# Patient Record
Sex: MALE | Race: WHITE | NOT HISPANIC OR LATINO | Employment: STUDENT | URBAN - METROPOLITAN AREA
[De-identification: names, ages, dates, MRNs, and addresses within clinical notes are randomized per-mention and may not be internally consistent; named-entity substitution may affect disease eponyms.]

---

## 2017-12-14 ENCOUNTER — HOSPITAL ENCOUNTER (EMERGENCY)
Facility: HOSPITAL | Age: 22
Discharge: HOME | End: 2017-12-14
Attending: FAMILY MEDICINE
Payer: COMMERCIAL

## 2017-12-14 VITALS
SYSTOLIC BLOOD PRESSURE: 134 MMHG | OXYGEN SATURATION: 97 % | TEMPERATURE: 99 F | BODY MASS INDEX: 26.66 KG/M2 | HEIGHT: 69 IN | RESPIRATION RATE: 18 BRPM | HEART RATE: 88 BPM | WEIGHT: 180 LBS | DIASTOLIC BLOOD PRESSURE: 68 MMHG

## 2017-12-14 DIAGNOSIS — F41.9 ANXIETY: Primary | ICD-10-CM

## 2017-12-14 LAB
ALBUMIN SERPL BCP-MCNC: 4.3 G/DL
ALP SERPL-CCNC: 58 U/L
ALT SERPL W/O P-5'-P-CCNC: 15 U/L
AMPHET+METHAMPHET UR QL: NEGATIVE
ANION GAP SERPL CALC-SCNC: 6 MMOL/L
APAP SERPL-MCNC: <3 UG/ML
AST SERPL-CCNC: 15 U/L
BARBITURATES UR QL SCN>200 NG/ML: NEGATIVE
BASOPHILS # BLD AUTO: 0.02 K/UL
BASOPHILS NFR BLD: 0.4 %
BENZODIAZ UR QL SCN>200 NG/ML: NORMAL
BILIRUB SERPL-MCNC: 0.2 MG/DL
BILIRUB UR QL STRIP: NEGATIVE
BUN SERPL-MCNC: 11 MG/DL
BZE UR QL SCN: NEGATIVE
CALCIUM SERPL-MCNC: 10.1 MG/DL
CANNABINOIDS UR QL SCN: NORMAL
CHLORIDE SERPL-SCNC: 108 MMOL/L
CLARITY UR REFRACT.AUTO: CLEAR
CO2 SERPL-SCNC: 27 MMOL/L
COLOR UR AUTO: YELLOW
CREAT SERPL-MCNC: 0.9 MG/DL
CREAT UR-MCNC: 137 MG/DL
DIFFERENTIAL METHOD: NORMAL
EOSINOPHIL # BLD AUTO: 0 K/UL
EOSINOPHIL NFR BLD: 0.6 %
ERYTHROCYTE [DISTWIDTH] IN BLOOD BY AUTOMATED COUNT: 12 %
EST. GFR  (AFRICAN AMERICAN): >60 ML/MIN/1.73 M^2
EST. GFR  (NON AFRICAN AMERICAN): >60 ML/MIN/1.73 M^2
ETHANOL SERPL-MCNC: <10 MG/DL
GLUCOSE SERPL-MCNC: 104 MG/DL
GLUCOSE UR QL STRIP: NEGATIVE
HCT VFR BLD AUTO: 44.6 %
HGB BLD-MCNC: 14.9 G/DL
HGB UR QL STRIP: NEGATIVE
IMM GRANULOCYTES # BLD AUTO: 0.01 K/UL
IMM GRANULOCYTES NFR BLD AUTO: 0.2 %
KETONES UR QL STRIP: NEGATIVE
LEUKOCYTE ESTERASE UR QL STRIP: NEGATIVE
LYMPHOCYTES # BLD AUTO: 1.1 K/UL
LYMPHOCYTES NFR BLD: 20.6 %
MCH RBC QN AUTO: 29.7 PG
MCHC RBC AUTO-ENTMCNC: 33.4 G/DL
MCV RBC AUTO: 89 FL
METHADONE UR QL SCN>300 NG/ML: NEGATIVE
MONOCYTES # BLD AUTO: 0.5 K/UL
MONOCYTES NFR BLD: 8.3 %
NEUTROPHILS # BLD AUTO: 3.8 K/UL
NEUTROPHILS NFR BLD: 69.9 %
NITRITE UR QL STRIP: NEGATIVE
NRBC BLD-RTO: 0 /100 WBC
OPIATES UR QL SCN: NEGATIVE
PCP UR QL SCN>25 NG/ML: NEGATIVE
PH UR STRIP: 6 [PH] (ref 5–8)
PLATELET # BLD AUTO: 188 K/UL
PMV BLD AUTO: 11.4 FL
POTASSIUM SERPL-SCNC: 4.3 MMOL/L
PROT SERPL-MCNC: 7.9 G/DL
PROT UR QL STRIP: NEGATIVE
RBC # BLD AUTO: 5.01 M/UL
SODIUM SERPL-SCNC: 141 MMOL/L
SP GR UR STRIP: 1.02 (ref 1–1.03)
TOXICOLOGY INFORMATION: NORMAL
TSH SERPL DL<=0.005 MIU/L-ACNC: 1.42 UIU/ML
URN SPEC COLLECT METH UR: NORMAL
UROBILINOGEN UR STRIP-ACNC: NEGATIVE EU/DL
WBC # BLD AUTO: 5.43 K/UL

## 2017-12-14 PROCEDURE — 80329 ANALGESICS NON-OPIOID 1 OR 2: CPT

## 2017-12-14 PROCEDURE — 84443 ASSAY THYROID STIM HORMONE: CPT

## 2017-12-14 PROCEDURE — 85025 COMPLETE CBC W/AUTO DIFF WBC: CPT

## 2017-12-14 PROCEDURE — 93010 ELECTROCARDIOGRAM REPORT: CPT | Mod: ,,, | Performed by: PEDIATRICS

## 2017-12-14 PROCEDURE — 99284 EMERGENCY DEPT VISIT MOD MDM: CPT | Mod: 25

## 2017-12-14 PROCEDURE — 93005 ELECTROCARDIOGRAM TRACING: CPT

## 2017-12-14 PROCEDURE — 80053 COMPREHEN METABOLIC PANEL: CPT

## 2017-12-14 PROCEDURE — 81003 URINALYSIS AUTO W/O SCOPE: CPT

## 2017-12-14 PROCEDURE — 99284 EMERGENCY DEPT VISIT MOD MDM: CPT | Mod: ,,,

## 2017-12-14 PROCEDURE — 80320 DRUG SCREEN QUANTALCOHOLS: CPT

## 2017-12-14 PROCEDURE — 80307 DRUG TEST PRSMV CHEM ANLYZR: CPT

## 2017-12-14 NOTE — ED PROVIDER NOTES
Encounter Date: 12/14/2017       History     Chief Complaint   Patient presents with    Psychiatric Evaluation     i need psyche eval, denies suicidal/homicidal ideation, tough week at school, couldn't make decisions, feeling down     22-year-old male with medical history of ADHD presents to the ED for psychiatric evaluation.  Patient denies suicidal or homicidal ideation.  Patient is a student at [employer] and had anxiety/depression breakdown last week during finals week.  Patient reports an accumulation of increased stress including breaking up with girlfriend and having poor support group in [place].  Patient does endorse drug and alcohol use.  He states he was using drugs more frequently at the beginning of the semester and realized there causing more harm than good.  Patient does report drinking a glass of wine and smoking pot last night.  He is also prescribed Vyvanse which he has not taken in over a week.  Last week when patient was having his breakdown, he reports becoming diaphoretic and short of breath.  Patient denies chest pain, shortness breath, weakness, syncope, nausea, vomiting, headache, changes in vision.  Patient's mother attempted to make appointment with psychiatry however was not able to get in until January so was told to report to the ED to be seen sooner.          Review of patient's allergies indicates:   Allergen Reactions    Sulfa (sulfonamide antibiotics) Rash     Past Medical History:   Diagnosis Date    ADHD (attention deficit hyperactivity disorder)     Anesthesia complication     woke up with rash,vomiting after surgery    PONV (postoperative nausea and vomiting)      Past Surgical History:   Procedure Laterality Date    NASAL SINUS SURGERY       Family History   Problem Relation Age of Onset    Diabetes Paternal Uncle     Glaucoma Maternal Grandmother     Cataracts Maternal Grandmother     Glaucoma Maternal Grandfather     Cancer Paternal Grandmother     Thyroid disease  Paternal Grandmother     Amblyopia Neg Hx     Blindness Neg Hx     Hypertension Neg Hx     Macular degeneration Neg Hx     Retinal detachment Neg Hx     Strabismus Neg Hx     Stroke Neg Hx      Social History   Substance Use Topics    Smoking status: Never Smoker    Smokeless tobacco: Not on file    Alcohol use Yes     Review of Systems   Constitutional: Negative for chills, diaphoresis, fatigue and fever.   HENT: Negative for congestion and sore throat.    Eyes: Negative for visual disturbance.   Respiratory: Negative for cough and shortness of breath.    Cardiovascular: Negative for chest pain and leg swelling.   Gastrointestinal: Negative for abdominal distention, nausea and vomiting.   Genitourinary: Negative for dysuria and hematuria.   Musculoskeletal: Negative for myalgias and neck pain.   Skin: Negative for rash.   Neurological: Negative for syncope, weakness, light-headedness and headaches.   Psychiatric/Behavioral: Positive for sleep disturbance. Negative for behavioral problems, hallucinations, self-injury and suicidal ideas. The patient is nervous/anxious.        Physical Exam     Initial Vitals [12/14/17 1049]   BP Pulse Resp Temp SpO2   134/68 88 18 98.9 °F (37.2 °C) 97 %      MAP       90         Physical Exam    Vitals reviewed.  Constitutional: He appears well-developed and well-nourished. He is not diaphoretic. No distress.   HENT:   Head: Normocephalic and atraumatic.   Nose: Nose normal.   Mouth/Throat: Oropharynx is clear and moist. No oropharyngeal exudate.   Eyes: Conjunctivae and EOM are normal. Pupils are equal, round, and reactive to light. Right eye exhibits no discharge. Left eye exhibits no discharge.   Neck: Normal range of motion. Neck supple. No thyromegaly present.   Cardiovascular: Normal rate, regular rhythm and intact distal pulses.   Pulmonary/Chest: Breath sounds normal. No respiratory distress. He has no wheezes. He exhibits no tenderness.   Abdominal: Soft. Bowel  sounds are normal. He exhibits no distension. There is no tenderness. There is no rebound.   Musculoskeletal: Normal range of motion. He exhibits no tenderness.   Lymphadenopathy:     He has no cervical adenopathy.   Neurological: He is alert and oriented to person, place, and time. He has normal strength. No cranial nerve deficit or sensory deficit.   Skin: Skin is warm and dry. Capillary refill takes less than 2 seconds. No rash noted.   Psychiatric: He has a normal mood and affect. His speech is normal and behavior is normal. Thought content normal. His mood appears not anxious. He is not agitated, not aggressive, not slowed and not withdrawn. Cognition and memory are normal. Cognition and memory are not impaired. He does not express impulsivity. He does not exhibit a depressed mood. He expresses no homicidal and no suicidal ideation. He expresses no suicidal plans and no homicidal plans.         ED Course   Procedures  Labs Reviewed   COMPREHENSIVE METABOLIC PANEL - Abnormal; Notable for the following:        Result Value    Anion Gap 6 (*)     All other components within normal limits   ACETAMINOPHEN LEVEL - Abnormal; Notable for the following:     Acetaminophen (Tylenol), Serum <3.0 (*)     All other components within normal limits   TSH   CBC W/ AUTO DIFFERENTIAL   URINALYSIS   DRUG SCREEN PANEL, URINE EMERGENCY   ALCOHOL,MEDICAL (ETHANOL)             Medical Decision Making:   History:   Old Medical Records: I decided to obtain old medical records.  Old Records Summarized: records from clinic visits.  Clinical Tests:   Lab Tests: Ordered and Reviewed  Medical Tests: Ordered and Reviewed       APC / Resident Notes:   22-year-old male with medical history of ADHD presents to the ED for psychiatric evaluation.    DDX includes but is not  Limited to anxiety, depression, electrolyte abnormality, drug abuse. Will get basic labs, toxicology screen, UA, TSH and contact psych. EKG shows NSR. Psychiatry did curbside  consult and agreed patient did not need admission and was ok to be discharged. Patient was given psychiatry contact information and encouraged to get on appointment waitlist for possibility of an appointment before January. Patient verbally understood that if he becomes suicidal or homicidal that he is to report to eD. Discharged to home in stable condition, return to ED warnings given, follow up and patient care instructions given.      I have discussed and reviewed with my supervising physician.              ED Course      Clinical Impression:   The encounter diagnosis was Anxiety.    Disposition:   Disposition: Discharged  Condition: Stable                        Mary Ardon PA-C  12/14/17 8089

## 2017-12-14 NOTE — PROVIDER PROGRESS NOTES - EMERGENCY DEPT.
Encounter Date: 12/14/2017    ED Physician Progress Notes           Normal sinus rhythm with incomplete right bundle branch block, otherwise normal EKG.  No STEMI

## 2017-12-14 NOTE — ED NOTES
"Pt stated "Been under a lot of stress at school, been feeling overwhelmed, missed finals, feeling depressed, had to move out of frat house, cant sleep, sleep mostly during day and very little at night" Denies SI/HI, denies plan or intent. Pt calm and cooperative, flat affect, thoughts linear. Pt reports "denisha just wont stop or slow down so can sleep".   "

## 2017-12-14 NOTE — ED NOTES
"  Pt c/o "insomnia, depressed, overwhelmed".           GENERAL: Pt appears well developed and nourished, in NAD, AAOX4    HEENT:   Head normocephalic and atraumatic. Extraocular muscles intact.  Pupil ER to                  light and accomadations. Nares normal. Mouth hydrated and without lesions.                   MMM. Posterior pharynx clear of exudate and lesions.    NECK:    Supple. No carotid bruits. No lymphodenopathy or thyromegaly..    LUNGS:  Clear to auscultation    HEART:  Regular rate and rhythm without murmur.    ABDOMEN:  SRNt. Non distended. Positive BS throughout. No hepatoslenomegaly                      Noted.    EXTREMITIES: Without cyanosis, clubbing, rash, lesions or edema.    NEURO:  Cranial nerves II through XII grossly intact.    PSYCHIATRIC: Denies SI/HI    SKIN:  No ulcerations or induration present.             "

## 2018-05-01 ENCOUNTER — LAB VISIT (OUTPATIENT)
Dept: LAB | Facility: OTHER | Age: 23
End: 2018-05-01
Attending: INTERNAL MEDICINE
Payer: COMMERCIAL

## 2018-05-01 DIAGNOSIS — G47.09 OTHER INSOMNIA: ICD-10-CM

## 2018-05-01 DIAGNOSIS — R53.83 FATIGUE, UNSPECIFIED TYPE: ICD-10-CM

## 2018-05-01 LAB
ALBUMIN SERPL BCP-MCNC: 5 G/DL
ALP SERPL-CCNC: 53 U/L
ALT SERPL W/O P-5'-P-CCNC: 14 U/L
ANION GAP SERPL CALC-SCNC: 13 MMOL/L
AST SERPL-CCNC: 28 U/L
BASOPHILS # BLD AUTO: 0.01 K/UL
BASOPHILS NFR BLD: 0.1 %
BILIRUB SERPL-MCNC: 0.7 MG/DL
BUN SERPL-MCNC: 19 MG/DL
CALCIUM SERPL-MCNC: 10 MG/DL
CHLORIDE SERPL-SCNC: 100 MMOL/L
CO2 SERPL-SCNC: 24 MMOL/L
CREAT SERPL-MCNC: 1.2 MG/DL
DIFFERENTIAL METHOD: NORMAL
EOSINOPHIL # BLD AUTO: 0 K/UL
EOSINOPHIL NFR BLD: 0.6 %
ERYTHROCYTE [DISTWIDTH] IN BLOOD BY AUTOMATED COUNT: 12.6 %
EST. GFR  (AFRICAN AMERICAN): >60 ML/MIN/1.73 M^2
EST. GFR  (NON AFRICAN AMERICAN): >60 ML/MIN/1.73 M^2
FOLATE SERPL-MCNC: 12.8 NG/ML
GLUCOSE SERPL-MCNC: 89 MG/DL
HCT VFR BLD AUTO: 42.5 %
HGB BLD-MCNC: 14.4 G/DL
LYMPHOCYTES # BLD AUTO: 1.6 K/UL
LYMPHOCYTES NFR BLD: 23.8 %
MCH RBC QN AUTO: 30.6 PG
MCHC RBC AUTO-ENTMCNC: 33.9 G/DL
MCV RBC AUTO: 90 FL
MONOCYTES # BLD AUTO: 0.5 K/UL
MONOCYTES NFR BLD: 8 %
NEUTROPHILS # BLD AUTO: 4.5 K/UL
NEUTROPHILS NFR BLD: 67.4 %
PLATELET # BLD AUTO: 200 K/UL
PMV BLD AUTO: 11.7 FL
POTASSIUM SERPL-SCNC: 3.8 MMOL/L
PROT SERPL-MCNC: 8.3 G/DL
RBC # BLD AUTO: 4.71 M/UL
SODIUM SERPL-SCNC: 137 MMOL/L
T4 FREE SERPL-MCNC: 1.08 NG/DL
TSH SERPL DL<=0.005 MIU/L-ACNC: 0.99 UIU/ML
VIT B12 SERPL-MCNC: 502 PG/ML
WBC # BLD AUTO: 6.71 K/UL

## 2018-05-01 PROCEDURE — 80053 COMPREHEN METABOLIC PANEL: CPT

## 2018-05-01 PROCEDURE — 36415 COLL VENOUS BLD VENIPUNCTURE: CPT

## 2018-05-01 PROCEDURE — 86592 SYPHILIS TEST NON-TREP QUAL: CPT

## 2018-05-01 PROCEDURE — 82607 VITAMIN B-12: CPT

## 2018-05-01 PROCEDURE — 86235 NUCLEAR ANTIGEN ANTIBODY: CPT | Mod: 59

## 2018-05-01 PROCEDURE — 85025 COMPLETE CBC W/AUTO DIFF WBC: CPT

## 2018-05-01 PROCEDURE — 84443 ASSAY THYROID STIM HORMONE: CPT

## 2018-05-01 PROCEDURE — 86038 ANTINUCLEAR ANTIBODIES: CPT

## 2018-05-01 PROCEDURE — 84439 ASSAY OF FREE THYROXINE: CPT

## 2018-05-01 PROCEDURE — 86703 HIV-1/HIV-2 1 RESULT ANTBDY: CPT

## 2018-05-01 PROCEDURE — 86039 ANTINUCLEAR ANTIBODIES (ANA): CPT

## 2018-05-01 PROCEDURE — 82746 ASSAY OF FOLIC ACID SERUM: CPT

## 2018-05-01 PROCEDURE — 84445 ASSAY OF TSI GLOBULIN: CPT

## 2018-05-02 LAB
ANA SER QL IF: POSITIVE
ANA TITR SER IF: NORMAL {TITER}
HIV 1+2 AB+HIV1 P24 AG SERPL QL IA: NEGATIVE
RPR SER QL: NORMAL

## 2018-05-03 LAB — TSI SER-ACNC: <0.1 IU/L

## 2018-05-05 LAB
ANTI SM ANTIBODY: 1.57 EU
ANTI SM/RNP ANTIBODY: 1.36 EU
ANTI-SM INTERPRETATION: NEGATIVE
ANTI-SM/RNP INTERPRETATION: NEGATIVE
ANTI-SSA ANTIBODY: 1.91 EU
ANTI-SSA INTERPRETATION: NEGATIVE
ANTI-SSB ANTIBODY: 0.28 EU
ANTI-SSB INTERPRETATION: NEGATIVE
DSDNA AB SER-ACNC: NORMAL [IU]/ML

## 2018-06-01 ENCOUNTER — LAB VISIT (OUTPATIENT)
Dept: LAB | Facility: OTHER | Age: 23
End: 2018-06-01
Attending: INTERNAL MEDICINE
Payer: COMMERCIAL

## 2018-06-01 DIAGNOSIS — R53.83 LETHARGY: ICD-10-CM

## 2018-06-01 DIAGNOSIS — R53.82 CHRONIC FATIGUE: ICD-10-CM

## 2018-06-01 LAB
25(OH)D3+25(OH)D2 SERPL-MCNC: 26 NG/ML
ALBUMIN SERPL BCP-MCNC: 4.4 G/DL
ALP SERPL-CCNC: 47 U/L
ALT SERPL W/O P-5'-P-CCNC: 13 U/L
ANION GAP SERPL CALC-SCNC: 9 MMOL/L
AST SERPL-CCNC: 17 U/L
BILIRUB SERPL-MCNC: 0.5 MG/DL
BUN SERPL-MCNC: 14 MG/DL
CALCIUM SERPL-MCNC: 9.8 MG/DL
CHLORIDE SERPL-SCNC: 107 MMOL/L
CHOLEST SERPL-MCNC: 177 MG/DL
CHOLEST/HDLC SERPL: 4.3 {RATIO}
CO2 SERPL-SCNC: 25 MMOL/L
CREAT SERPL-MCNC: 0.9 MG/DL
EST. GFR  (AFRICAN AMERICAN): >60 ML/MIN/1.73 M^2
EST. GFR  (NON AFRICAN AMERICAN): >60 ML/MIN/1.73 M^2
ESTIMATED AVG GLUCOSE: 94 MG/DL
FERRITIN SERPL-MCNC: 114 NG/ML
GLUCOSE SERPL-MCNC: 92 MG/DL
HBA1C MFR BLD HPLC: 4.9 %
HDLC SERPL-MCNC: 41 MG/DL
HDLC SERPL: 23.2 %
LDLC SERPL CALC-MCNC: 117 MG/DL
NONHDLC SERPL-MCNC: 136 MG/DL
POTASSIUM SERPL-SCNC: 4.2 MMOL/L
PROT SERPL-MCNC: 7.7 G/DL
SODIUM SERPL-SCNC: 141 MMOL/L
T3FREE SERPL-MCNC: 2.5 PG/ML
TESTOST SERPL-MCNC: 578 NG/DL
TRIGL SERPL-MCNC: 95 MG/DL

## 2018-06-01 PROCEDURE — 82306 VITAMIN D 25 HYDROXY: CPT

## 2018-06-01 PROCEDURE — 80061 LIPID PANEL: CPT

## 2018-06-01 PROCEDURE — 83036 HEMOGLOBIN GLYCOSYLATED A1C: CPT

## 2018-06-01 PROCEDURE — 84481 FREE ASSAY (FT-3): CPT

## 2018-06-01 PROCEDURE — 36415 COLL VENOUS BLD VENIPUNCTURE: CPT

## 2018-06-01 PROCEDURE — 84402 ASSAY OF FREE TESTOSTERONE: CPT

## 2018-06-01 PROCEDURE — 82728 ASSAY OF FERRITIN: CPT

## 2018-06-01 PROCEDURE — 82955 ASSAY OF G6PD ENZYME: CPT

## 2018-06-01 PROCEDURE — 84403 ASSAY OF TOTAL TESTOSTERONE: CPT

## 2018-06-01 PROCEDURE — 80053 COMPREHEN METABOLIC PANEL: CPT

## 2018-06-04 LAB — G6PD RBC-CCNT: 12.6 U/G HGB (ref 7–20.5)

## 2018-06-05 LAB — TESTOST FREE SERPL-MCNC: 18.9 PG/ML

## 2018-08-02 ENCOUNTER — LAB VISIT (OUTPATIENT)
Dept: LAB | Facility: OTHER | Age: 23
End: 2018-08-02
Attending: INTERNAL MEDICINE
Payer: COMMERCIAL

## 2018-08-02 DIAGNOSIS — Z00.00 ROUTINE HEALTH MAINTENANCE: ICD-10-CM

## 2018-08-02 LAB
ALBUMIN SERPL BCP-MCNC: 5 G/DL
ALP SERPL-CCNC: 51 U/L
ALT SERPL W/O P-5'-P-CCNC: 14 U/L
ANION GAP SERPL CALC-SCNC: 13 MMOL/L
AST SERPL-CCNC: 17 U/L
BASOPHILS # BLD AUTO: 0.01 K/UL
BASOPHILS NFR BLD: 0.2 %
BILIRUB SERPL-MCNC: 0.7 MG/DL
BUN SERPL-MCNC: 14 MG/DL
CALCIUM SERPL-MCNC: 10.3 MG/DL
CHLORIDE SERPL-SCNC: 103 MMOL/L
CO2 SERPL-SCNC: 24 MMOL/L
CREAT SERPL-MCNC: 1.2 MG/DL
DHEA-S SERPL-MCNC: 1043.3 UG/DL
DIFFERENTIAL METHOD: ABNORMAL
EOSINOPHIL # BLD AUTO: 0 K/UL
EOSINOPHIL NFR BLD: 0.8 %
ERYTHROCYTE [DISTWIDTH] IN BLOOD BY AUTOMATED COUNT: 12.2 %
EST. GFR  (AFRICAN AMERICAN): >60 ML/MIN/1.73 M^2
EST. GFR  (NON AFRICAN AMERICAN): >60 ML/MIN/1.73 M^2
GLUCOSE SERPL-MCNC: 91 MG/DL
HCT VFR BLD AUTO: 46.5 %
HGB BLD-MCNC: 15.3 G/DL
LYMPHOCYTES # BLD AUTO: 1.3 K/UL
LYMPHOCYTES NFR BLD: 27 %
MCH RBC QN AUTO: 29.7 PG
MCHC RBC AUTO-ENTMCNC: 32.9 G/DL
MCV RBC AUTO: 90 FL
MONOCYTES # BLD AUTO: 0.5 K/UL
MONOCYTES NFR BLD: 9.3 %
NEUTROPHILS # BLD AUTO: 3.1 K/UL
NEUTROPHILS NFR BLD: 62.5 %
PLATELET # BLD AUTO: 151 K/UL
PMV BLD AUTO: 13 FL
POTASSIUM SERPL-SCNC: 3.7 MMOL/L
PROT SERPL-MCNC: 8.4 G/DL
RBC # BLD AUTO: 5.16 M/UL
SODIUM SERPL-SCNC: 140 MMOL/L
T3FREE SERPL-MCNC: 2.9 PG/ML
TESTOST SERPL-MCNC: 634 NG/DL
TSH SERPL DL<=0.005 MIU/L-ACNC: 1.46 UIU/ML
WBC # BLD AUTO: 4.93 K/UL

## 2018-08-02 PROCEDURE — 84443 ASSAY THYROID STIM HORMONE: CPT

## 2018-08-02 PROCEDURE — 84403 ASSAY OF TOTAL TESTOSTERONE: CPT

## 2018-08-02 PROCEDURE — 84481 FREE ASSAY (FT-3): CPT

## 2018-08-02 PROCEDURE — 82627 DEHYDROEPIANDROSTERONE: CPT

## 2018-08-02 PROCEDURE — 84402 ASSAY OF FREE TESTOSTERONE: CPT

## 2018-08-02 PROCEDURE — 36415 COLL VENOUS BLD VENIPUNCTURE: CPT

## 2018-08-02 PROCEDURE — 80053 COMPREHEN METABOLIC PANEL: CPT

## 2018-08-02 PROCEDURE — 85025 COMPLETE CBC W/AUTO DIFF WBC: CPT

## 2018-08-06 LAB — TESTOST FREE SERPL-MCNC: 20.4 PG/ML

## 2019-07-02 RX ORDER — DOXYCYCLINE HYCLATE 100 MG
100 TABLET ORAL DAILY
Qty: 42 TABLET | Refills: 0 | Status: SHIPPED | OUTPATIENT
Start: 2019-07-02 | End: 2022-03-23

## 2019-08-09 ENCOUNTER — LAB VISIT (OUTPATIENT)
Dept: LAB | Facility: OTHER | Age: 24
End: 2019-08-09
Attending: INTERNAL MEDICINE
Payer: COMMERCIAL

## 2019-08-09 DIAGNOSIS — Z00.00 ROUTINE GENERAL MEDICAL EXAMINATION AT A HEALTH CARE FACILITY: ICD-10-CM

## 2019-08-09 LAB
ALBUMIN SERPL BCP-MCNC: 4.5 G/DL (ref 3.5–5.2)
ALP SERPL-CCNC: 50 U/L (ref 55–135)
ALT SERPL W/O P-5'-P-CCNC: 36 U/L (ref 10–44)
ANION GAP SERPL CALC-SCNC: 12 MMOL/L (ref 8–16)
AST SERPL-CCNC: 22 U/L (ref 10–40)
BASOPHILS # BLD AUTO: 0.02 K/UL (ref 0–0.2)
BASOPHILS NFR BLD: 0.4 % (ref 0–1.9)
BILIRUB SERPL-MCNC: 0.6 MG/DL (ref 0.1–1)
BUN SERPL-MCNC: 9 MG/DL (ref 6–20)
CALCIUM SERPL-MCNC: 10.2 MG/DL (ref 8.7–10.5)
CHLORIDE SERPL-SCNC: 102 MMOL/L (ref 95–110)
CO2 SERPL-SCNC: 26 MMOL/L (ref 23–29)
CREAT SERPL-MCNC: 0.9 MG/DL (ref 0.5–1.4)
DIFFERENTIAL METHOD: NORMAL
EOSINOPHIL # BLD AUTO: 0.1 K/UL (ref 0–0.5)
EOSINOPHIL NFR BLD: 1.2 % (ref 0–8)
ERYTHROCYTE [DISTWIDTH] IN BLOOD BY AUTOMATED COUNT: 12.2 % (ref 11.5–14.5)
EST. GFR  (AFRICAN AMERICAN): >60 ML/MIN/1.73 M^2
EST. GFR  (NON AFRICAN AMERICAN): >60 ML/MIN/1.73 M^2
GLUCOSE SERPL-MCNC: 88 MG/DL (ref 70–110)
HCT VFR BLD AUTO: 47 % (ref 40–54)
HGB BLD-MCNC: 15.6 G/DL (ref 14–18)
IMM GRANULOCYTES # BLD AUTO: 0.02 K/UL (ref 0–0.04)
IMM GRANULOCYTES NFR BLD AUTO: 0.4 % (ref 0–0.5)
LYMPHOCYTES # BLD AUTO: 1.4 K/UL (ref 1–4.8)
LYMPHOCYTES NFR BLD: 28.5 % (ref 18–48)
MCH RBC QN AUTO: 29.3 PG (ref 27–31)
MCHC RBC AUTO-ENTMCNC: 33.2 G/DL (ref 32–36)
MCV RBC AUTO: 88 FL (ref 82–98)
MONOCYTES # BLD AUTO: 0.5 K/UL (ref 0.3–1)
MONOCYTES NFR BLD: 9.7 % (ref 4–15)
NEUTROPHILS # BLD AUTO: 3 K/UL (ref 1.8–7.7)
NEUTROPHILS NFR BLD: 59.8 % (ref 38–73)
NRBC BLD-RTO: 0 /100 WBC
PLATELET # BLD AUTO: 187 K/UL (ref 150–350)
PMV BLD AUTO: 12.2 FL (ref 9.2–12.9)
POTASSIUM SERPL-SCNC: 3.7 MMOL/L (ref 3.5–5.1)
PROT SERPL-MCNC: 7.7 G/DL (ref 6–8.4)
RBC # BLD AUTO: 5.32 M/UL (ref 4.6–6.2)
SODIUM SERPL-SCNC: 140 MMOL/L (ref 136–145)
TSH SERPL DL<=0.005 MIU/L-ACNC: 2.01 UIU/ML (ref 0.4–4)
WBC # BLD AUTO: 5.05 K/UL (ref 3.9–12.7)

## 2019-08-09 PROCEDURE — 80053 COMPREHEN METABOLIC PANEL: CPT

## 2019-08-09 PROCEDURE — 85025 COMPLETE CBC W/AUTO DIFF WBC: CPT

## 2019-08-09 PROCEDURE — 36415 COLL VENOUS BLD VENIPUNCTURE: CPT

## 2019-08-09 PROCEDURE — 84443 ASSAY THYROID STIM HORMONE: CPT

## 2020-05-13 ENCOUNTER — TELEPHONE (OUTPATIENT)
Dept: INTERNAL MEDICINE | Facility: CLINIC | Age: 25
End: 2020-05-13

## 2020-05-13 DIAGNOSIS — Z20.822 EXPOSURE TO COVID-19 VIRUS: Primary | ICD-10-CM

## 2020-10-13 ENCOUNTER — LAB VISIT (OUTPATIENT)
Dept: INTERNAL MEDICINE | Facility: CLINIC | Age: 25
End: 2020-10-13
Payer: COMMERCIAL

## 2020-10-13 DIAGNOSIS — Z20.828 EXPOSURE TO SARS-ASSOCIATED CORONAVIRUS: ICD-10-CM

## 2020-10-13 LAB — SARS-COV-2 RNA RESP QL NAA+PROBE: NOT DETECTED

## 2020-10-13 PROCEDURE — U0003 INFECTIOUS AGENT DETECTION BY NUCLEIC ACID (DNA OR RNA); SEVERE ACUTE RESPIRATORY SYNDROME CORONAVIRUS 2 (SARS-COV-2) (CORONAVIRUS DISEASE [COVID-19]), AMPLIFIED PROBE TECHNIQUE, MAKING USE OF HIGH THROUGHPUT TECHNOLOGIES AS DESCRIBED BY CMS-2020-01-R: HCPCS

## 2020-11-24 ENCOUNTER — OFFICE VISIT (OUTPATIENT)
Dept: URGENT CARE | Facility: CLINIC | Age: 25
End: 2020-11-24
Payer: COMMERCIAL

## 2020-11-24 VITALS
SYSTOLIC BLOOD PRESSURE: 118 MMHG | OXYGEN SATURATION: 97 % | BODY MASS INDEX: 25.05 KG/M2 | HEIGHT: 70 IN | DIASTOLIC BLOOD PRESSURE: 82 MMHG | WEIGHT: 175 LBS | TEMPERATURE: 98 F | RESPIRATION RATE: 18 BRPM | HEART RATE: 72 BPM

## 2020-11-24 DIAGNOSIS — J06.9 VIRAL URI: ICD-10-CM

## 2020-11-24 DIAGNOSIS — R05.9 COUGH: ICD-10-CM

## 2020-11-24 DIAGNOSIS — Z20.822 EXPOSURE TO COVID-19 VIRUS: Primary | ICD-10-CM

## 2020-11-24 LAB
CTP QC/QA: YES
SARS-COV-2 RDRP RESP QL NAA+PROBE: NEGATIVE

## 2020-11-24 PROCEDURE — 3008F PR BODY MASS INDEX (BMI) DOCUMENTED: ICD-10-PCS | Mod: CPTII,S$GLB,, | Performed by: NURSE PRACTITIONER

## 2020-11-24 PROCEDURE — U0002: ICD-10-PCS | Mod: QW,S$GLB,, | Performed by: NURSE PRACTITIONER

## 2020-11-24 PROCEDURE — 3008F BODY MASS INDEX DOCD: CPT | Mod: CPTII,S$GLB,, | Performed by: NURSE PRACTITIONER

## 2020-11-24 PROCEDURE — 99214 OFFICE O/P EST MOD 30 MIN: CPT | Mod: S$GLB,,, | Performed by: NURSE PRACTITIONER

## 2020-11-24 PROCEDURE — 99214 PR OFFICE/OUTPT VISIT, EST, LEVL IV, 30-39 MIN: ICD-10-PCS | Mod: S$GLB,,, | Performed by: NURSE PRACTITIONER

## 2020-11-24 PROCEDURE — U0002 COVID-19 LAB TEST NON-CDC: HCPCS | Mod: QW,S$GLB,, | Performed by: NURSE PRACTITIONER

## 2020-11-25 NOTE — PROGRESS NOTES
"Subjective:       Patient ID: Vikash Leung is a 25 y.o. male.    Vitals:  height is 5' 10" (1.778 m) and weight is 79.4 kg (175 lb). His temperature is 98 °F (36.7 °C). His blood pressure is 118/82 and his pulse is 72. His respiration is 18 and oxygen saturation is 97%.     Chief Complaint: Cough and Shortness of Breath    C/o feeling hot and having a cough that just started today.  He had close exposure on Friday to covid 19.    Cough  This is a new problem. The current episode started in the past 7 days. The problem has been unchanged. The problem occurs hourly. The cough is non-productive. Pertinent negatives include no chest pain, chills, ear congestion, ear pain, eye redness, fever, headaches, heartburn, hemoptysis, myalgias, nasal congestion, postnasal drip, rash, rhinorrhea, sore throat, shortness of breath, sweats, weight loss or wheezing. Nothing aggravates the symptoms. There is no history of asthma, bronchitis, COPD or pneumonia.       Constitution: Negative for chills, sweating, fatigue and fever.   HENT: Negative for ear pain, congestion, postnasal drip, sinus pain, sinus pressure, sore throat, trouble swallowing and voice change.    Neck: Negative for neck pain and painful lymph nodes.   Cardiovascular: Negative for chest trauma, chest pain, leg swelling, palpitations and passing out.   Eyes: Negative for eye redness.   Respiratory: Positive for cough. Negative for sleep apnea, chest tightness, sputum production, bloody sputum, COPD, shortness of breath, stridor, wheezing and asthma.    Gastrointestinal: Negative for abdominal pain, nausea, vomiting and heartburn.   Musculoskeletal: Negative for back pain, pain with walking and muscle ache.   Skin: Negative for rash.   Allergic/Immunologic: Negative for seasonal allergies and asthma.   Neurological: Negative for light-headedness, passing out and headaches.   Hematologic/Lymphatic: Negative for swollen lymph nodes and history of blood clots. "   Psychiatric/Behavioral: Negative for nervous/anxious. The patient is not nervous/anxious.        Objective:      Physical Exam   Constitutional: He is oriented to person, place, and time. He appears well-developed. He is cooperative.  Non-toxic appearance. He does not appear ill. No distress.   HENT:   Head: Normocephalic and atraumatic.   Ears:   Right Ear: Hearing and external ear normal.   Left Ear: Hearing and external ear normal.   Nose: No mucosal edema or nasal deformity. No epistaxis. Right sinus exhibits no maxillary sinus tenderness and no frontal sinus tenderness. Left sinus exhibits no maxillary sinus tenderness and no frontal sinus tenderness.   Mouth/Throat: Uvula is midline, oropharynx is clear and moist and mucous membranes are normal. No trismus in the jaw. Normal dentition. No uvula swelling. No posterior oropharyngeal edema.      Comments: Mask remains in place for exam  Eyes: Conjunctivae and lids are normal. No scleral icterus.   Neck: Trachea normal, full passive range of motion without pain and phonation normal. Neck supple. No neck rigidity. No edema and no erythema present.   Cardiovascular: Normal rate, regular rhythm, normal heart sounds and normal pulses.   Pulmonary/Chest: Effort normal and breath sounds normal. No respiratory distress. He has no decreased breath sounds. He has no rhonchi.    Comments: Speaking in full clear sentences with room air pulse ox of 97%    Abdominal: Normal appearance.   Musculoskeletal: Normal range of motion.         General: No deformity.   Neurological: He is alert and oriented to person, place, and time. He exhibits normal muscle tone. Coordination normal.   Skin: Skin is warm, dry, intact, not diaphoretic and not pale. Psychiatric: His speech is normal and behavior is normal. Judgment and thought content normal.   Nursing note and vitals reviewed.    Results for orders placed or performed in visit on 11/24/20   POCT COVID-19 Rapid Screening   Result  Value Ref Range    POC Rapid COVID Negative Negative     Acceptable Yes          Assessment:       1. Cough        Plan:       Lab reviewed.  Cough  -     POCT COVID-19 Rapid Screening      Patient Instructions   You have tested negative for COVID-19 today.  If you did not have any close exposure as defined below, then effective today, you can return to your normal daily activities including social distancing, wearing masks, and frequent handwashing.         A close exposure is defined as anyone who had a masked or an unmasked exposure to a known COVID -19 positive person, at less than 6 ft for more than 15 minutes.  If your exposure meets this definition, then you are required to quarantine for 14 days per the CDC.         The 14 day quarantine begins from the day you were exposed, not the day of your test.  For example, if your exposure was on a Monday, and you waited until Friday of the same week to get tested and it was negative, your 14 day quarantine begins from that Monday, not the Friday you tested negative.         If you developed symptoms since the exposure, and your test was negative today, you still have to quarantine for 14 days from the date of the exposure.         So if you meet the definition of a close exposure, A NEGATIVE TEST DOES NOT GET YOU OUT OF 14 DAYS OF QUARANTINE!         Go to the Emergency Room if symptoms or condition worsens in any way     Follow up as needed.     Zyrtec, Claritin, or Allegra OTC as directed for the next 7 days     Flonase OTC as directed for the next 7 days     Salt Water Nasal Spray OTC 3x/day for the next 7 days     Tylenol 500mg 2 tabs by mouth every 6 hours     Mucinex or Robitussin OTC as directed for cough    Sudafed as directed for runny nose and congestion    Viral Upper Respiratory Illness (Adult)  You have a viral upper respiratory illness (URI), which is another term for the common cold. This illness is contagious during the first few days.  It is spread through the air by coughing and sneezing. It may also be spread by direct contact (touching the sick person and then touching your own eyes, nose, or mouth). Frequent handwashing will decrease risk of spread. Most viral illnesses go away within 7 to 10 days with rest and simple home remedies. Sometimes the illness may last for several weeks. Antibiotics will not kill a virus, and they are generally not prescribed for this condition.    Home care  · If symptoms are severe, rest at home for the first 2 to 3 days. When you resume activity, don't let yourself get too tired.  · Avoid being exposed to cigarette smoke (yours or others).  · You may use acetaminophen or ibuprofen to control pain and fever, unless another medicine was prescribed. (Note: If you have chronic liver or kidney disease, have ever had a stomach ulcer or gastrointestinal bleeding, or are taking blood-thinning medicines, talk with your healthcare provider before using these medicines.) Aspirin should never be given to anyone under 18 years of age who is ill with a viral infection or fever. It may cause severe liver or brain damage.  · Your appetite may be poor, so a light diet is fine. Avoid dehydration by drinking 6 to 8 glasses of fluids per day (water, soft drinks, juices, tea, or soup). Extra fluids will help loosen secretions in the nose and lungs.  · Over-the-counter cold medicines will not shorten the length of time youre sick, but they may be helpful for the following symptoms: cough, sore throat, and nasal and sinus congestion. (Note: Do not use decongestants if you have high blood pressure.)  Follow-up care  Follow up with your healthcare provider, or as advised.  When to seek medical advice  Call your healthcare provider right away if any of these occur:  · Cough with lots of colored sputum (mucus)  · Severe headache; face, neck, or ear pain  · Difficulty swallowing due to throat pain  · Fever of 100.4°F (38°C)  Call 911, or  get immediate medical care  Call emergency services right away if any of these occur:  · Chest pain, shortness of breath, wheezing, or difficulty breathing  · Coughing up blood  · Inability to swallow due to throat pain  Date Last Reviewed: 9/13/2015  © 8701-4137 [employer]. [address] [place] [id number]. All rights reserved. This information is not intended as a substitute for professional medical care. Always follow your healthcare professional's instructions.

## 2020-11-25 NOTE — PATIENT INSTRUCTIONS
You have tested negative for COVID-19 today.  If you did not have any close exposure as defined below, then effective today, you can return to your normal daily activities including social distancing, wearing masks, and frequent handwashing.         A close exposure is defined as anyone who had a masked or an unmasked exposure to a known COVID -19 positive person, at less than 6 ft for more than 15 minutes.  If your exposure meets this definition, then you are required to quarantine for 14 days per the CDC.         The 14 day quarantine begins from the day you were exposed, not the day of your test.  For example, if your exposure was on a Monday, and you waited until Friday of the same week to get tested and it was negative, your 14 day quarantine begins from that Monday, not the Friday you tested negative.         If you developed symptoms since the exposure, and your test was negative today, you still have to quarantine for 14 days from the date of the exposure.         So if you meet the definition of a close exposure, A NEGATIVE TEST DOES NOT GET YOU OUT OF 14 DAYS OF QUARANTINE!         Go to the Emergency Room if symptoms or condition worsens in any way     Follow up as needed.     Zyrtec, Claritin, or Allegra OTC as directed for the next 7 days     Flonase OTC as directed for the next 7 days     Salt Water Nasal Spray OTC 3x/day for the next 7 days     Tylenol 500mg 2 tabs by mouth every 6 hours     Mucinex or Robitussin OTC as directed for cough    Sudafed as directed for runny nose and congestion    Viral Upper Respiratory Illness (Adult)  You have a viral upper respiratory illness (URI), which is another term for the common cold. This illness is contagious during the first few days. It is spread through the air by coughing and sneezing. It may also be spread by direct contact (touching the sick person and then touching your own eyes, nose, or mouth). Frequent handwashing will decrease risk of spread. Most  viral illnesses go away within 7 to 10 days with rest and simple home remedies. Sometimes the illness may last for several weeks. Antibiotics will not kill a virus, and they are generally not prescribed for this condition.    Home care  · If symptoms are severe, rest at home for the first 2 to 3 days. When you resume activity, don't let yourself get too tired.  · Avoid being exposed to cigarette smoke (yours or others).  · You may use acetaminophen or ibuprofen to control pain and fever, unless another medicine was prescribed. (Note: If you have chronic liver or kidney disease, have ever had a stomach ulcer or gastrointestinal bleeding, or are taking blood-thinning medicines, talk with your healthcare provider before using these medicines.) Aspirin should never be given to anyone under 18 years of age who is ill with a viral infection or fever. It may cause severe liver or brain damage.  · Your appetite may be poor, so a light diet is fine. Avoid dehydration by drinking 6 to 8 glasses of fluids per day (water, soft drinks, juices, tea, or soup). Extra fluids will help loosen secretions in the nose and lungs.  · Over-the-counter cold medicines will not shorten the length of time youre sick, but they may be helpful for the following symptoms: cough, sore throat, and nasal and sinus congestion. (Note: Do not use decongestants if you have high blood pressure.)  Follow-up care  Follow up with your healthcare provider, or as advised.  When to seek medical advice  Call your healthcare provider right away if any of these occur:  · Cough with lots of colored sputum (mucus)  · Severe headache; face, neck, or ear pain  · Difficulty swallowing due to throat pain  · Fever of 100.4°F (38°C)  Call 911, or get immediate medical care  Call emergency services right away if any of these occur:  · Chest pain, shortness of breath, wheezing, or difficulty breathing  · Coughing up blood  · Inability to swallow due to throat pain  Date  Last Reviewed: 9/13/2015  © 6358-0527 [employer] [address] [place] [address], [place] [address]. All rights reserved. This information is not intended as a substitute for professional medical care. Always follow your healthcare professional's instructions.

## 2020-12-29 ENCOUNTER — CLINICAL SUPPORT (OUTPATIENT)
Dept: URGENT CARE | Facility: CLINIC | Age: 25
End: 2020-12-29
Payer: COMMERCIAL

## 2020-12-29 DIAGNOSIS — U07.1 COVID-19 VIRUS DETECTED: ICD-10-CM

## 2020-12-29 DIAGNOSIS — Z11.9 SCREENING EXAMINATION FOR UNSPECIFIED INFECTIOUS DISEASE: Primary | ICD-10-CM

## 2020-12-29 LAB
CTP QC/QA: YES
SARS-COV-2 RDRP RESP QL NAA+PROBE: POSITIVE

## 2020-12-29 PROCEDURE — U0002: ICD-10-PCS | Mod: QW,S$GLB,, | Performed by: NURSE PRACTITIONER

## 2020-12-29 PROCEDURE — U0002 COVID-19 LAB TEST NON-CDC: HCPCS | Mod: QW,S$GLB,, | Performed by: NURSE PRACTITIONER

## 2021-04-26 ENCOUNTER — PATIENT MESSAGE (OUTPATIENT)
Dept: RESEARCH | Facility: HOSPITAL | Age: 26
End: 2021-04-26

## 2021-05-06 ENCOUNTER — IMMUNIZATION (OUTPATIENT)
Dept: INTERNAL MEDICINE | Facility: CLINIC | Age: 26
End: 2021-05-06
Payer: COMMERCIAL

## 2021-05-06 DIAGNOSIS — Z23 NEED FOR VACCINATION: Primary | ICD-10-CM

## 2021-05-06 PROCEDURE — 91300 COVID-19, MRNA, LNP-S, PF, 30 MCG/0.3 ML DOSE VACCINE: CPT | Mod: PBBFAC | Performed by: INTERNAL MEDICINE

## 2021-06-02 ENCOUNTER — IMMUNIZATION (OUTPATIENT)
Dept: INTERNAL MEDICINE | Facility: CLINIC | Age: 26
End: 2021-06-02
Payer: COMMERCIAL

## 2021-06-02 DIAGNOSIS — Z23 NEED FOR VACCINATION: Primary | ICD-10-CM

## 2021-06-02 PROCEDURE — 91300 COVID-19, MRNA, LNP-S, PF, 30 MCG/0.3 ML DOSE VACCINE: CPT | Mod: PBBFAC | Performed by: INTERNAL MEDICINE

## 2021-06-02 PROCEDURE — 0002A COVID-19, MRNA, LNP-S, PF, 30 MCG/0.3 ML DOSE VACCINE: CPT | Mod: PBBFAC | Performed by: INTERNAL MEDICINE

## 2021-07-14 ENCOUNTER — TELEPHONE (OUTPATIENT)
Dept: SPORTS MEDICINE | Facility: CLINIC | Age: 26
End: 2021-07-14

## 2021-07-15 ENCOUNTER — HOSPITAL ENCOUNTER (OUTPATIENT)
Dept: RADIOLOGY | Facility: HOSPITAL | Age: 26
Discharge: HOME | End: 2021-07-15
Attending: STUDENT IN AN ORGANIZED HEALTH CARE EDUCATION/TRAINING PROGRAM
Payer: COMMERCIAL

## 2021-07-15 ENCOUNTER — HOSPITAL ENCOUNTER (OUTPATIENT)
Dept: RADIOLOGY | Facility: HOSPITAL | Age: 26
Discharge: HOME | End: 2021-07-15
Attending: ORTHOPAEDIC SURGERY
Payer: COMMERCIAL

## 2021-07-15 ENCOUNTER — OFFICE VISIT (OUTPATIENT)
Dept: SPORTS MEDICINE | Facility: CLINIC | Age: 26
End: 2021-07-15
Payer: COMMERCIAL

## 2021-07-15 VITALS
HEART RATE: 70 BPM | WEIGHT: 167 LBS | DIASTOLIC BLOOD PRESSURE: 80 MMHG | BODY MASS INDEX: 23.91 KG/M2 | SYSTOLIC BLOOD PRESSURE: 128 MMHG | HEIGHT: 70 IN

## 2021-07-15 DIAGNOSIS — M54.2 CERVICALGIA: ICD-10-CM

## 2021-07-15 DIAGNOSIS — M25.512 LEFT SHOULDER PAIN, UNSPECIFIED CHRONICITY: ICD-10-CM

## 2021-07-15 DIAGNOSIS — M25.512 LEFT SHOULDER PAIN, UNSPECIFIED CHRONICITY: Primary | ICD-10-CM

## 2021-07-15 PROCEDURE — 1125F PR PAIN SEVERITY QUANTIFIED, PAIN PRESENT: ICD-10-PCS | Mod: S$GLB,,, | Performed by: ORTHOPAEDIC SURGERY

## 2021-07-15 PROCEDURE — 99999 PR PBB SHADOW E&M-EST. PATIENT-LVL III: CPT | Mod: PBBFAC,,, | Performed by: ORTHOPAEDIC SURGERY

## 2021-07-15 PROCEDURE — 73030 X-RAY EXAM OF SHOULDER: CPT | Mod: TC,LT

## 2021-07-15 PROCEDURE — 99999 PR PBB SHADOW E&M-EST. PATIENT-LVL III: ICD-10-PCS | Mod: PBBFAC,,, | Performed by: ORTHOPAEDIC SURGERY

## 2021-07-15 PROCEDURE — 73030 XR SHOULDER COMPLETE 2 OR MORE VIEWS LEFT: ICD-10-PCS | Mod: 26,LT,, | Performed by: RADIOLOGY

## 2021-07-15 PROCEDURE — 99203 OFFICE O/P NEW LOW 30 MIN: CPT | Mod: S$GLB,,, | Performed by: ORTHOPAEDIC SURGERY

## 2021-07-15 PROCEDURE — 72052 XR CERVICAL SPINE 5 VIEW WITH FLEX AND EXT: ICD-10-PCS | Mod: 26,,, | Performed by: RADIOLOGY

## 2021-07-15 PROCEDURE — 99203 PR OFFICE/OUTPT VISIT, NEW, LEVL III, 30-44 MIN: ICD-10-PCS | Mod: S$GLB,,, | Performed by: ORTHOPAEDIC SURGERY

## 2021-07-15 PROCEDURE — 1125F AMNT PAIN NOTED PAIN PRSNT: CPT | Mod: S$GLB,,, | Performed by: ORTHOPAEDIC SURGERY

## 2021-07-15 PROCEDURE — 3008F BODY MASS INDEX DOCD: CPT | Mod: CPTII,S$GLB,, | Performed by: ORTHOPAEDIC SURGERY

## 2021-07-15 PROCEDURE — 3008F PR BODY MASS INDEX (BMI) DOCUMENTED: ICD-10-PCS | Mod: CPTII,S$GLB,, | Performed by: ORTHOPAEDIC SURGERY

## 2021-07-15 PROCEDURE — 73030 X-RAY EXAM OF SHOULDER: CPT | Mod: 26,LT,, | Performed by: RADIOLOGY

## 2021-07-15 PROCEDURE — 72052 X-RAY EXAM NECK SPINE 6/>VWS: CPT | Mod: TC

## 2021-07-15 PROCEDURE — 72052 X-RAY EXAM NECK SPINE 6/>VWS: CPT | Mod: 26,,, | Performed by: RADIOLOGY

## 2021-07-26 ENCOUNTER — HOSPITAL ENCOUNTER (OUTPATIENT)
Dept: RADIOLOGY | Facility: HOSPITAL | Age: 26
Discharge: HOME | End: 2021-07-26
Attending: STUDENT IN AN ORGANIZED HEALTH CARE EDUCATION/TRAINING PROGRAM
Payer: COMMERCIAL

## 2021-07-26 ENCOUNTER — CLINICAL SUPPORT (OUTPATIENT)
Dept: REHABILITATION | Facility: HOSPITAL | Age: 26
End: 2021-07-26
Payer: COMMERCIAL

## 2021-07-26 DIAGNOSIS — R29.898 SHOULDER WEAKNESS: ICD-10-CM

## 2021-07-26 DIAGNOSIS — M54.2 CERVICALGIA: ICD-10-CM

## 2021-07-26 DIAGNOSIS — G25.89 SCAPULAR DYSKINESIS: ICD-10-CM

## 2021-07-26 DIAGNOSIS — M54.2 CERVICAL SPINE PAIN: ICD-10-CM

## 2021-07-26 PROCEDURE — 97112 NEUROMUSCULAR REEDUCATION: CPT

## 2021-07-26 PROCEDURE — 97161 PT EVAL LOW COMPLEX 20 MIN: CPT | Mod: 97

## 2021-07-26 PROCEDURE — 72141 MRI NECK SPINE W/O DYE: CPT | Mod: TC

## 2021-07-26 PROCEDURE — 72141 MRI NECK SPINE W/O DYE: CPT | Mod: 26,,, | Performed by: RADIOLOGY

## 2021-07-26 PROCEDURE — 72141 MRI CERVICAL SPINE WITHOUT CONTRAST: ICD-10-PCS | Mod: 26,,, | Performed by: RADIOLOGY

## 2021-07-26 PROCEDURE — 97140 MANUAL THERAPY 1/> REGIONS: CPT

## 2021-07-29 ENCOUNTER — OFFICE VISIT (OUTPATIENT)
Dept: SPORTS MEDICINE | Facility: CLINIC | Age: 26
End: 2021-07-29
Payer: COMMERCIAL

## 2021-07-29 VITALS
WEIGHT: 167 LBS | HEART RATE: 66 BPM | HEIGHT: 70 IN | SYSTOLIC BLOOD PRESSURE: 117 MMHG | BODY MASS INDEX: 23.91 KG/M2 | DIASTOLIC BLOOD PRESSURE: 73 MMHG

## 2021-07-29 DIAGNOSIS — M25.512 LEFT SHOULDER PAIN, UNSPECIFIED CHRONICITY: Primary | ICD-10-CM

## 2021-07-29 PROCEDURE — 3008F PR BODY MASS INDEX (BMI) DOCUMENTED: ICD-10-PCS | Mod: CPTII,S$GLB,, | Performed by: ORTHOPAEDIC SURGERY

## 2021-07-29 PROCEDURE — 99999 PR PBB SHADOW E&M-EST. PATIENT-LVL III: CPT | Mod: PBBFAC,,, | Performed by: ORTHOPAEDIC SURGERY

## 2021-07-29 PROCEDURE — 1159F MED LIST DOCD IN RCRD: CPT | Mod: CPTII,S$GLB,, | Performed by: ORTHOPAEDIC SURGERY

## 2021-07-29 PROCEDURE — 1126F PR PAIN SEVERITY QUANTIFIED, NO PAIN PRESENT: ICD-10-PCS | Mod: CPTII,S$GLB,, | Performed by: ORTHOPAEDIC SURGERY

## 2021-07-29 PROCEDURE — 99999 PR PBB SHADOW E&M-EST. PATIENT-LVL III: ICD-10-PCS | Mod: PBBFAC,,, | Performed by: ORTHOPAEDIC SURGERY

## 2021-07-29 PROCEDURE — 3078F DIAST BP <80 MM HG: CPT | Mod: CPTII,S$GLB,, | Performed by: ORTHOPAEDIC SURGERY

## 2021-07-29 PROCEDURE — 99214 PR OFFICE/OUTPT VISIT, EST, LEVL IV, 30-39 MIN: ICD-10-PCS | Mod: S$GLB,,, | Performed by: ORTHOPAEDIC SURGERY

## 2021-07-29 PROCEDURE — 3074F PR MOST RECENT SYSTOLIC BLOOD PRESSURE < 130 MM HG: ICD-10-PCS | Mod: CPTII,S$GLB,, | Performed by: ORTHOPAEDIC SURGERY

## 2021-07-29 PROCEDURE — 1160F PR REVIEW ALL MEDS BY PRESCRIBER/CLIN PHARMACIST DOCUMENTED: ICD-10-PCS | Mod: CPTII,S$GLB,, | Performed by: ORTHOPAEDIC SURGERY

## 2021-07-29 PROCEDURE — 1159F PR MEDICATION LIST DOCUMENTED IN MEDICAL RECORD: ICD-10-PCS | Mod: CPTII,S$GLB,, | Performed by: ORTHOPAEDIC SURGERY

## 2021-07-29 PROCEDURE — 99214 OFFICE O/P EST MOD 30 MIN: CPT | Mod: S$GLB,,, | Performed by: ORTHOPAEDIC SURGERY

## 2021-07-29 PROCEDURE — 3078F PR MOST RECENT DIASTOLIC BLOOD PRESSURE < 80 MM HG: ICD-10-PCS | Mod: CPTII,S$GLB,, | Performed by: ORTHOPAEDIC SURGERY

## 2021-07-29 PROCEDURE — 1126F AMNT PAIN NOTED NONE PRSNT: CPT | Mod: CPTII,S$GLB,, | Performed by: ORTHOPAEDIC SURGERY

## 2021-07-29 PROCEDURE — 3074F SYST BP LT 130 MM HG: CPT | Mod: CPTII,S$GLB,, | Performed by: ORTHOPAEDIC SURGERY

## 2021-07-29 PROCEDURE — 1160F RVW MEDS BY RX/DR IN RCRD: CPT | Mod: CPTII,S$GLB,, | Performed by: ORTHOPAEDIC SURGERY

## 2021-07-29 PROCEDURE — 3008F BODY MASS INDEX DOCD: CPT | Mod: CPTII,S$GLB,, | Performed by: ORTHOPAEDIC SURGERY

## 2021-07-30 ENCOUNTER — CLINICAL SUPPORT (OUTPATIENT)
Dept: REHABILITATION | Facility: HOSPITAL | Age: 26
End: 2021-07-30
Payer: COMMERCIAL

## 2021-07-30 DIAGNOSIS — R29.898 SHOULDER WEAKNESS: ICD-10-CM

## 2021-07-30 DIAGNOSIS — G25.89 SCAPULAR DYSKINESIS: ICD-10-CM

## 2021-07-30 DIAGNOSIS — M54.2 CERVICAL SPINE PAIN: ICD-10-CM

## 2021-07-30 PROCEDURE — 97140 MANUAL THERAPY 1/> REGIONS: CPT

## 2021-07-30 PROCEDURE — 97112 NEUROMUSCULAR REEDUCATION: CPT

## 2021-08-09 ENCOUNTER — LAB VISIT (OUTPATIENT)
Dept: INTERNAL MEDICINE | Facility: CLINIC | Age: 26
End: 2021-08-09
Payer: COMMERCIAL

## 2021-08-09 DIAGNOSIS — Z20.822 EXPOSURE TO COVID-19 VIRUS: ICD-10-CM

## 2021-08-09 PROCEDURE — U0003 INFECTIOUS AGENT DETECTION BY NUCLEIC ACID (DNA OR RNA); SEVERE ACUTE RESPIRATORY SYNDROME CORONAVIRUS 2 (SARS-COV-2) (CORONAVIRUS DISEASE [COVID-19]), AMPLIFIED PROBE TECHNIQUE, MAKING USE OF HIGH THROUGHPUT TECHNOLOGIES AS DESCRIBED BY CMS-2020-01-R: HCPCS | Performed by: INTERNAL MEDICINE

## 2021-08-09 PROCEDURE — U0005 INFEC AGEN DETEC AMPLI PROBE: HCPCS | Performed by: INTERNAL MEDICINE

## 2021-08-10 LAB
SARS-COV-2 RNA RESP QL NAA+PROBE: NOT DETECTED
SARS-COV-2- CYCLE NUMBER: -1

## 2021-08-18 ENCOUNTER — CLINICAL SUPPORT (OUTPATIENT)
Dept: REHABILITATION | Facility: HOSPITAL | Age: 26
End: 2021-08-18
Payer: COMMERCIAL

## 2021-08-18 DIAGNOSIS — R29.898 SHOULDER WEAKNESS: ICD-10-CM

## 2021-08-18 DIAGNOSIS — M54.2 CERVICAL SPINE PAIN: ICD-10-CM

## 2021-08-18 DIAGNOSIS — G25.89 SCAPULAR DYSKINESIS: ICD-10-CM

## 2021-08-18 PROCEDURE — 97140 MANUAL THERAPY 1/> REGIONS: CPT | Performed by: PHYSICAL THERAPIST

## 2021-08-18 PROCEDURE — 97110 THERAPEUTIC EXERCISES: CPT | Mod: 97 | Performed by: PHYSICAL THERAPIST

## 2021-08-18 PROCEDURE — 97112 NEUROMUSCULAR REEDUCATION: CPT | Performed by: PHYSICAL THERAPIST

## 2021-08-25 ENCOUNTER — CLINICAL SUPPORT (OUTPATIENT)
Dept: REHABILITATION | Facility: HOSPITAL | Age: 26
End: 2021-08-25
Payer: COMMERCIAL

## 2021-08-25 DIAGNOSIS — G25.89 SCAPULAR DYSKINESIS: ICD-10-CM

## 2021-08-25 DIAGNOSIS — M54.2 CERVICAL SPINE PAIN: ICD-10-CM

## 2021-08-25 DIAGNOSIS — R29.898 SHOULDER WEAKNESS: ICD-10-CM

## 2021-08-25 PROCEDURE — 97110 THERAPEUTIC EXERCISES: CPT | Performed by: PHYSICAL THERAPIST

## 2021-08-25 PROCEDURE — 97112 NEUROMUSCULAR REEDUCATION: CPT | Mod: 97 | Performed by: PHYSICAL THERAPIST

## 2021-08-25 PROCEDURE — 97140 MANUAL THERAPY 1/> REGIONS: CPT | Mod: 97 | Performed by: PHYSICAL THERAPIST

## 2021-09-10 ENCOUNTER — TELEPHONE (OUTPATIENT)
Dept: SPORTS MEDICINE | Facility: CLINIC | Age: 26
End: 2021-09-10

## 2021-09-16 ENCOUNTER — OFFICE VISIT (OUTPATIENT)
Dept: SPORTS MEDICINE | Facility: CLINIC | Age: 26
End: 2021-09-16
Payer: COMMERCIAL

## 2021-09-16 VITALS — HEIGHT: 69 IN | WEIGHT: 171.94 LBS | BODY MASS INDEX: 25.47 KG/M2

## 2021-09-16 DIAGNOSIS — M25.512 LEFT SHOULDER PAIN, UNSPECIFIED CHRONICITY: Primary | ICD-10-CM

## 2021-09-16 PROCEDURE — 99999 PR PBB SHADOW E&M-EST. PATIENT-LVL III: ICD-10-PCS | Mod: PBBFAC,,, | Performed by: ORTHOPAEDIC SURGERY

## 2021-09-16 PROCEDURE — 99999 PR PBB SHADOW E&M-EST. PATIENT-LVL III: CPT | Mod: PBBFAC,,, | Performed by: ORTHOPAEDIC SURGERY

## 2021-12-16 ENCOUNTER — CLINICAL SUPPORT (OUTPATIENT)
Dept: URGENT CARE | Facility: CLINIC | Age: 26
End: 2021-12-16
Payer: COMMERCIAL

## 2021-12-16 DIAGNOSIS — Z20.822 CLOSE EXPOSURE TO COVID-19 VIRUS: Primary | ICD-10-CM

## 2021-12-16 LAB
CTP QC/QA: YES
SARS-COV-2 RDRP RESP QL NAA+PROBE: NEGATIVE

## 2021-12-16 PROCEDURE — 99211 OFF/OP EST MAY X REQ PHY/QHP: CPT | Mod: S$GLB,CS,, | Performed by: FAMILY MEDICINE

## 2021-12-16 PROCEDURE — 99211 PR OFFICE/OUTPT VISIT, EST, LEVL I: ICD-10-PCS | Mod: S$GLB,CS,, | Performed by: FAMILY MEDICINE

## 2021-12-16 PROCEDURE — U0002: ICD-10-PCS | Mod: QW,S$GLB,, | Performed by: FAMILY MEDICINE

## 2021-12-16 PROCEDURE — U0002 COVID-19 LAB TEST NON-CDC: HCPCS | Mod: QW,S$GLB,, | Performed by: FAMILY MEDICINE

## 2021-12-27 ENCOUNTER — LAB VISIT (OUTPATIENT)
Dept: PRIMARY CARE CLINIC | Facility: OTHER | Age: 26
End: 2021-12-27
Attending: INTERNAL MEDICINE
Payer: COMMERCIAL

## 2021-12-27 DIAGNOSIS — R05.9 COUGH: ICD-10-CM

## 2021-12-27 PROCEDURE — U0003 INFECTIOUS AGENT DETECTION BY NUCLEIC ACID (DNA OR RNA); SEVERE ACUTE RESPIRATORY SYNDROME CORONAVIRUS 2 (SARS-COV-2) (CORONAVIRUS DISEASE [COVID-19]), AMPLIFIED PROBE TECHNIQUE, MAKING USE OF HIGH THROUGHPUT TECHNOLOGIES AS DESCRIBED BY CMS-2020-01-R: HCPCS | Performed by: INTERNAL MEDICINE

## 2021-12-29 LAB
SARS-COV-2 RNA RESP QL NAA+PROBE: NOT DETECTED
SARS-COV-2- CYCLE NUMBER: NORMAL

## 2023-04-19 ENCOUNTER — OFFICE VISIT (OUTPATIENT)
Dept: URGENT CARE | Facility: CLINIC | Age: 28
End: 2023-04-19
Payer: COMMERCIAL

## 2023-04-19 VITALS
DIASTOLIC BLOOD PRESSURE: 92 MMHG | OXYGEN SATURATION: 98 % | RESPIRATION RATE: 18 BRPM | TEMPERATURE: 98 F | HEIGHT: 69 IN | BODY MASS INDEX: 25.92 KG/M2 | SYSTOLIC BLOOD PRESSURE: 136 MMHG | HEART RATE: 76 BPM | WEIGHT: 175 LBS

## 2023-04-19 DIAGNOSIS — S61.209A AVULSION OF FINGER, INITIAL ENCOUNTER: Primary | ICD-10-CM

## 2023-04-19 PROCEDURE — 99214 PR OFFICE/OUTPT VISIT, EST, LEVL IV, 30-39 MIN: ICD-10-PCS | Mod: 25,S$GLB,, | Performed by: INTERNAL MEDICINE

## 2023-04-19 PROCEDURE — 90471 IMMUNIZATION ADMIN: CPT | Mod: S$GLB,,, | Performed by: INTERNAL MEDICINE

## 2023-04-19 PROCEDURE — 90715 TDAP VACCINE 7 YRS/> IM: CPT | Mod: S$GLB,,, | Performed by: INTERNAL MEDICINE

## 2023-04-19 PROCEDURE — 99214 OFFICE O/P EST MOD 30 MIN: CPT | Mod: 25,S$GLB,, | Performed by: INTERNAL MEDICINE

## 2023-04-19 PROCEDURE — 90715 TDAP VACCINE GREATER THAN OR EQUAL TO 7YO IM: ICD-10-PCS | Mod: S$GLB,,, | Performed by: INTERNAL MEDICINE

## 2023-04-19 PROCEDURE — 90471 TDAP VACCINE GREATER THAN OR EQUAL TO 7YO IM: ICD-10-PCS | Mod: S$GLB,,, | Performed by: INTERNAL MEDICINE

## 2023-04-19 RX ORDER — MUPIROCIN 20 MG/G
OINTMENT TOPICAL 3 TIMES DAILY
Qty: 22 G | Refills: 1 | Status: SHIPPED | OUTPATIENT
Start: 2023-04-19 | End: 2023-04-26

## 2023-04-19 NOTE — PROGRESS NOTES
"Subjective:      Patient ID: Vikash Leung is a 27 y.o. male.    Vitals:  height is 5' 9" (1.753 m) and weight is 79.4 kg (175 lb). His temperature is 98.1 °F (36.7 °C). His blood pressure is 136/92 (abnormal) and his pulse is 76. His respiration is 18 and oxygen saturation is 98%.     Chief Complaint: Laceration    Patient presents with c.o laceration to left 2nd digit. Pateint was cutting potatoes and inadvertently cut his finger.  Injury occurred last night around 1130 pm. The wound has continued to bleed. Patient states he was not able to remove the bandage from his finger and fainted this morning due to pain and the sight of the blood. He denies any head trauma. No headache, dizziness, nausea or vomiting. Last tetanus 2009.         Laceration   Incident onset: 1130 pm. Pain location: left 2nd digit. The laceration mechanism was a dirty knife. The pain is at a severity of 10/10. He reports no foreign bodies present. His tetanus status is out of date (2009).   Skin:  Positive for laceration.    Objective:     Physical Exam        Full ROM of finger with flexion and extension.     Assessment:     1. Avulsion of finger, initial encounter        Plan:       Avulsion of finger, initial encounter  -     (In Office Administered) Tdap Vaccine  -     mupirocin (BACTROBAN) 2 % ointment; Apply topically 3 (three) times daily. for 7 days  Dispense: 22 g; Refill: 1      Digital block with 1% lidocaine performed in clinic, additional anesthesia with LET jelly on gauze pad also applied. Hemostasis achieved with pressure and wound elevation, wound irrigated. Pressure dressing applied. Patient to return tomorrow for dressing change then 2x daily until scab forms thereafter. TDAP updated.               "

## 2023-04-20 ENCOUNTER — CLINICAL SUPPORT (OUTPATIENT)
Dept: URGENT CARE | Facility: CLINIC | Age: 28
End: 2023-04-20
Payer: COMMERCIAL

## 2023-04-20 VITALS
OXYGEN SATURATION: 98 % | TEMPERATURE: 98 F | DIASTOLIC BLOOD PRESSURE: 80 MMHG | SYSTOLIC BLOOD PRESSURE: 132 MMHG | HEART RATE: 65 BPM | RESPIRATION RATE: 18 BRPM

## 2023-04-20 DIAGNOSIS — S69.92XA INJURY OF LEFT INDEX FINGER, INITIAL ENCOUNTER: ICD-10-CM

## 2023-04-20 DIAGNOSIS — S61.208D AVULSION OF SKIN OF INDEX FINGER, SUBSEQUENT ENCOUNTER: Primary | ICD-10-CM

## 2023-04-20 PROBLEM — F33.2 SEVERE EPISODE OF RECURRENT MAJOR DEPRESSIVE DISORDER, WITHOUT PSYCHOTIC FEATURES: Status: ACTIVE | Noted: 2019-01-25

## 2023-04-20 PROCEDURE — 99499 UNLISTED E&M SERVICE: CPT | Mod: S$GLB,,, | Performed by: NURSE PRACTITIONER

## 2023-04-20 PROCEDURE — 99499 NO LOS: ICD-10-PCS | Mod: S$GLB,,, | Performed by: NURSE PRACTITIONER

## 2023-04-20 NOTE — PROGRESS NOTES
Subjective:      Patient ID: Vikash Leung is a 27 y.o. male.    Vitals:  vitals were not taken for this visit.     Chief Complaint: No chief complaint on file.    27 year old male presents to clinic requesting dressing change to left index finger avulsion that occurred on yesterday.       Musculoskeletal:  Positive for trauma.   Skin:  Positive for avulsion. Negative for erythema.    Objective:     Physical Exam   Constitutional: He is oriented to person, place, and time. He appears well-developed.   HENT:   Head: Normocephalic and atraumatic. Head is without abrasion, without contusion and without laceration.   Ears:   Right Ear: External ear normal.   Left Ear: External ear normal.   Nose: Nose normal.   Mouth/Throat: Oropharynx is clear and moist and mucous membranes are normal.   Eyes: EOM and lids are normal. Extraocular movement intact   Neck: Trachea normal and phonation normal. Neck supple.   Cardiovascular: Normal rate.   Pulmonary/Chest: Effort normal.   Musculoskeletal: Normal range of motion.         General: Normal range of motion.      Left hand: Left index finger: Exhibits tenderness. Lt Index Finger - Injuries: avulsion.   Neurological: He is alert and oriented to person, place, and time.   Skin: Skin is warm, dry, intact and no rash. Capillary refill takes less than 2 seconds. No abrasion, No burn, No bruising, No erythema and No ecchymosis   Psychiatric: His speech is normal and behavior is normal. Judgment and thought content normal.   Nursing note and vitals reviewed.    Dressing removed after soaking in normal saline, area dried, bactroban applied and taped securely  Assessment:     1. Avulsion of skin of index finger, subsequent encounter    2. Injury of left index finger, initial encounter        Plan:       Avulsion of skin of index finger, subsequent encounter    Injury of left index finger, initial encounter      Patient Instructions   Please drink plenty of fluids.  Please get  plenty of rest.  Please return here or go to the Emergency Department for any concerns or worsening of condition.  Please return here for a recheck of your wound.  If not allergic, please take over the counter Tylenol (Acetaminophen) and/or Motrin (Ibuprofen) as directed for control of pain and/or fever.  Please follow up with your primary care doctor or specialist as needed.    If you  smoke, please stop smoking.

## 2023-04-20 NOTE — PATIENT INSTRUCTIONS
Return tomorrow for wound dressing change. If wound bleeds through current bandage, remove, apply pressure, elevate finger, apply mupurocin, non-stick bandage, then [place].     Return to clinic for any issues in the meantime.

## 2023-04-20 NOTE — PATIENT INSTRUCTIONS
Please drink plenty of fluids.  Please get plenty of rest.  Please return here or go to the Emergency Department for any concerns or worsening of condition.  Please return here for a recheck of your wound.  If not allergic, please take over the counter Tylenol (Acetaminophen) and/or Motrin (Ibuprofen) as directed for control of pain and/or fever.  Please follow up with your primary care doctor or specialist as needed.    If you  smoke, please stop smoking.

## 2024-06-29 ENCOUNTER — HOSPITAL ENCOUNTER (EMERGENCY)
Facility: HOSPITAL | Age: 29
Discharge: HOME | End: 2024-06-29

## 2024-06-29 VITALS
OXYGEN SATURATION: 97 % | DIASTOLIC BLOOD PRESSURE: 83 MMHG | TEMPERATURE: 98 F | BODY MASS INDEX: 29.62 KG/M2 | SYSTOLIC BLOOD PRESSURE: 129 MMHG | HEART RATE: 99 BPM | HEIGHT: 69 IN | WEIGHT: 200 LBS | RESPIRATION RATE: 19 BRPM

## 2024-06-29 PROCEDURE — 90715 TDAP VACCINE 7 YRS/> IM: CPT | Performed by: FAMILY MEDICINE

## 2024-06-29 PROCEDURE — 63600175 PHARM REV CODE 636 W HCPCS: Performed by: FAMILY MEDICINE

## 2024-06-29 PROCEDURE — 99284 EMERGENCY DEPT VISIT MOD MDM: CPT | Mod: 25

## 2024-06-29 PROCEDURE — 90471 IMMUNIZATION ADMIN: CPT | Performed by: FAMILY MEDICINE

## 2024-06-29 RX ADMIN — TETANUS TOXOID, REDUCED DIPHTHERIA TOXOID AND ACELLULAR PERTUSSIS VACCINE, ADSORBED 0.5 ML: 5; 2.5; 8; 8; 2.5 SUSPENSION INTRAMUSCULAR at 04:06

## 2024-06-29 NOTE — ED NOTES
Pt vomited into room trash can. Wheels locked, side rails up x2, call light within reach. Friend at bedside. RN and MD notified.

## 2024-06-29 NOTE — ED NOTES
Wound care instructions provided to patient. Head injury instructions provided and s/s to watch for and verbalized to return for new or worsening symptoms. Instructed patient to return in 7-10 days for staple removal. No reaction noted to Boostrix injection. Copies of d/c paperwork provided. Escorted to lobby to complete d/c process.

## 2024-06-29 NOTE — ED NOTES
Pt admitted to drink a few drink PTO. Pt states he feel and hit his head on side table with no LOC. Pt reports no dizziness at this time. Pt ambulated to exam room with steady gait. Friend at bedside. Friend reported taking taxi to ED.

## 2024-06-29 NOTE — ED NOTES
ERMD in with patient . Wound cleansed with sterile 0.9% NaCl and iodine. Wound closure with staples per MD. Tolerated well.

## 2025-06-10 NOTE — PROGRESS NOTES
Subjective:       Patient ID: Vikash Leung is a 25 y.o. male.    Vitals:  vitals were not taken for this visit.     Chief Complaint: COVID-19 Concerns    Patient presents to the clinic with COVID concerns, patient was given the option to see a provider. Patient is symptomatic and elects to not see a provider, they were given a handout which recognizes their decision to not see the provider today, as well as recommendations to follow up with their PCP. If their symptoms worsen, they will need to follow up with their PCP, any urgent care clinic, or ER for further evaluation.   Patient's temperature, O2 sats, and pulse were taken for this visit.   There were no vitals filed for this visit.   They were within normal limits. If not with in normal, they were advised that they should see the provider for evaluation. However, they adamantly refused despite provider's encouragement.     ROS    Objective:      Physical Exam      Assessment:       No diagnosis found.    Plan:         There are no diagnoses linked to this encounter.        Detail Level: Detailed